# Patient Record
Sex: MALE | Race: WHITE | NOT HISPANIC OR LATINO | URBAN - METROPOLITAN AREA
[De-identification: names, ages, dates, MRNs, and addresses within clinical notes are randomized per-mention and may not be internally consistent; named-entity substitution may affect disease eponyms.]

---

## 2018-05-12 ENCOUNTER — EMERGENCY (EMERGENCY)
Facility: HOSPITAL | Age: 28
LOS: 0 days | Discharge: HOME | End: 2018-05-12
Attending: EMERGENCY MEDICINE | Admitting: EMERGENCY MEDICINE

## 2018-05-12 VITALS
HEART RATE: 79 BPM | DIASTOLIC BLOOD PRESSURE: 91 MMHG | OXYGEN SATURATION: 99 % | SYSTOLIC BLOOD PRESSURE: 143 MMHG | RESPIRATION RATE: 19 BRPM

## 2018-05-12 VITALS
RESPIRATION RATE: 19 BRPM | SYSTOLIC BLOOD PRESSURE: 151 MMHG | HEIGHT: 74 IN | TEMPERATURE: 99 F | DIASTOLIC BLOOD PRESSURE: 98 MMHG | WEIGHT: 309.97 LBS | HEART RATE: 78 BPM

## 2018-05-12 DIAGNOSIS — Z90.81 ACQUIRED ABSENCE OF SPLEEN: Chronic | ICD-10-CM

## 2018-05-12 DIAGNOSIS — K08.89 OTHER SPECIFIED DISORDERS OF TEETH AND SUPPORTING STRUCTURES: ICD-10-CM

## 2018-05-12 RX ORDER — IBUPROFEN 200 MG
1 TABLET ORAL
Qty: 15 | Refills: 0
Start: 2018-05-12 | End: 2018-05-16

## 2018-05-12 RX ORDER — OXYCODONE AND ACETAMINOPHEN 5; 325 MG/1; MG/1
2 TABLET ORAL ONCE
Qty: 0 | Refills: 0 | Status: DISCONTINUED | OUTPATIENT
Start: 2018-05-12 | End: 2018-05-12

## 2018-05-12 RX ADMIN — OXYCODONE AND ACETAMINOPHEN 2 TABLET(S): 5; 325 TABLET ORAL at 03:35

## 2018-05-12 NOTE — ED PROVIDER NOTE - PHYSICAL EXAMINATION
VITAL SIGNS: I have reviewed nursing notes and confirm.  CONSTITUTIONAL: well-appearing, non-toxic, in pain  SKIN: Warm dry, normal skin turgor  HEAD: NCAT  ENT: + significant dental caries, tooth erosion to right molar and pre-molar, no gum redness or abscess.  NECK: Supple; non tender. Full ROM. No cervical LAD  CARD: RRR, no murmurs, rubs or gallops  RESP: clear to ausculation b/l.  No rales, rhonchi, or wheezing.  ABD: soft, + BS, non-tender, non-distended, no rebound or guarding.  NEURO: normal motor. normal sensory. CN II-XII intact.

## 2018-05-12 NOTE — ED PROVIDER NOTE - MEDICAL DECISION MAKING DETAILS
I personally evaluated patient. I agree with the findings and plan with all documentation on chart except as documented  in my note.    Full DC instructions discussed and patient knows when to seek immediate medical attention.  Patient has proper follow up.  All results discussed and patient aware they may require further work up.  Proper follow up ensured. Limitations of ED work up discussed.  Medications administered and prescribed/OTC home meds discussed.  All questions and concerns from patient or family addressed. Understanding of instructions verbalized.

## 2018-05-12 NOTE — ED PROVIDER NOTE - OBJECTIVE STATEMENT
29 y/o M who presents with right lower dental pain for 1 day.  Patient ate steak and had a piece stuck in his tooth.  He then started having extreme pain.  Patient reports pain as 10/10, sharp in nature, located to right lower mouth, radiating to jaw, worse with air hitting it and chewing.     Patient denies any fever, chills, pus drainage, bleeding, or other issues.  He has not been to the dentist in a very long time.

## 2018-05-12 NOTE — ED PROVIDER NOTE - NS ED ROS FT
Constitutional:  No fever, chills, lethargy, or abnormal weight loss  ENMT: + dental pain  Cardiac:  No chest pain or palpitations  Respiratory:  No cough or respiratory distress.   GI:  No nausea, vomiting, diarrhea or abdominal pain.  MS:  No back or joint pain.  Neuro:  No headache. No numbness, weakness, or tingling.   Skin:  No skin rash  Except as documented in the HPI,  all other systems are negative

## 2018-05-12 NOTE — ED PROVIDER NOTE - CHIEF COMPLAINT
The patient is a 28y Male complaining of see chief complaint quote. The patient is a 28y Male complaining of dental pain

## 2019-07-28 ENCOUNTER — EMERGENCY (EMERGENCY)
Facility: HOSPITAL | Age: 29
LOS: 0 days | Discharge: HOME | End: 2019-07-28
Attending: EMERGENCY MEDICINE | Admitting: EMERGENCY MEDICINE
Payer: COMMERCIAL

## 2019-07-28 VITALS
TEMPERATURE: 98 F | HEIGHT: 74 IN | RESPIRATION RATE: 18 BRPM | OXYGEN SATURATION: 98 % | DIASTOLIC BLOOD PRESSURE: 101 MMHG | HEART RATE: 97 BPM | WEIGHT: 300.05 LBS | SYSTOLIC BLOOD PRESSURE: 157 MMHG

## 2019-07-28 DIAGNOSIS — Z90.81 ACQUIRED ABSENCE OF SPLEEN: Chronic | ICD-10-CM

## 2019-07-28 DIAGNOSIS — Z79.1 LONG TERM (CURRENT) USE OF NON-STEROIDAL ANTI-INFLAMMATORIES (NSAID): ICD-10-CM

## 2019-07-28 DIAGNOSIS — Z79.891 LONG TERM (CURRENT) USE OF OPIATE ANALGESIC: ICD-10-CM

## 2019-07-28 DIAGNOSIS — L30.9 DERMATITIS, UNSPECIFIED: ICD-10-CM

## 2019-07-28 DIAGNOSIS — L03.116 CELLULITIS OF LEFT LOWER LIMB: ICD-10-CM

## 2019-07-28 DIAGNOSIS — L53.9 ERYTHEMATOUS CONDITION, UNSPECIFIED: ICD-10-CM

## 2019-07-28 PROCEDURE — 99283 EMERGENCY DEPT VISIT LOW MDM: CPT

## 2019-07-28 RX ORDER — CEPHALEXIN 500 MG
1 CAPSULE ORAL
Qty: 40 | Refills: 0
Start: 2019-07-28 | End: 2019-08-06

## 2019-07-28 RX ORDER — AZTREONAM 2 G
1 VIAL (EA) INJECTION
Qty: 20 | Refills: 0
Start: 2019-07-28 | End: 2019-08-06

## 2019-07-28 NOTE — ED PROVIDER NOTE - PHYSICAL EXAMINATION
Vital Signs: I have reviewed the initial vital signs.  Constitutional: well-nourished, appears stated age, no acute distress  Cardiovascular: regular rate, regular rhythm, well-perfused extremities  Respiratory: unlabored respiratory effort, clear to auscultation bilaterally  Skin: left shin 4 x 4 cm erythema, warmth, ttp left lateral sole dry scaly eczematous rash   Psychiatric: appropriate mood, appropriate affect

## 2019-07-28 NOTE — ED PROVIDER NOTE - NSFOLLOWUPINSTRUCTIONS_ED_ALL_ED_FT
Cellulitis    Cellulitis is a skin infection caused by bacteria. This condition occurs most often in the arms and lower legs but can occur anywhere over the body. Symptoms include redness, swelling, warm skin, tenderness, and chills/fever. If you were prescribed an antibiotic medicine, take it as told by your health care provider. Do not stop taking the antibiotic even if you start to feel better.    SEEK IMMEDIATE MEDICAL CARE IF YOU HAVE ANY OF THE FOLLOWING SYMPTOMS: worsening fever, red streaks coming from affected area, vomiting or diarrhea, or dizziness/lightheadedness. or if symptoms don't improve

## 2019-07-28 NOTE — ED PROVIDER NOTE - ATTENDING CONTRIBUTION TO CARE
28 yo M pmh of HTN presents with redness to his left shin. States that he noted it this morning. States that yesterday he had some nasal congestion and chills that have resolved. no fevers, no chills today. no hx of cellulitis. States that he had dry skin and commonly has cracks in his skin. no drainage. no numbness, tingling or weakness, no calf pain, no legs swelling.     CONSTITUTIONAL: Well-developed; well-nourished; in no acute distress.   SKIN: 4cm area of cellulitis to the left shin, no fluctuance, no edema.   ENT: No nasal discharge; airway clear. non erythematous pharynx.   NECK: Supple; non tender.  CARD: S1, S2 normal;  Regular rate and rhythm.   RESP: No wheezes, rales or rhonchi.  EXT: Normal ROM.  no pedal edema, no calf tenderness   NEURO: neurovascularly intact

## 2019-07-28 NOTE — ED ADULT NURSE NOTE - NSIMPLEMENTINTERV_GEN_ALL_ED
Implemented All Universal Safety Interventions:  [place] to call system. Call bell, personal items and telephone within reach. Instruct patient to call for assistance. Room bathroom lighting operational. Non-slip footwear when patient is off stretcher. Physically safe environment: no spills, clutter or unnecessary equipment. Stretcher in lowest position, wheels locked, appropriate side rails in place.

## 2019-07-28 NOTE — ED PROVIDER NOTE - CARE PROVIDER_API CALL
Mariusz Herrera)  Dermatology; Internal Medicine  [address] [place] [address]  Phone: [phone]  Fax: [phone]  Follow Up Time:

## 2019-07-28 NOTE — ED PROVIDER NOTE - CLINICAL SUMMARY MEDICAL DECISION MAKING FREE TEXT BOX
Patient presented with cellulitis to E. Area of redness marked. Understands to return if area of infections worsens or develops fevers. Sent home with antibiotics and dermatologist

## 2019-07-28 NOTE — ED ADULT TRIAGE NOTE - CHIEF COMPLAINT QUOTE
pt c/o pain/swelling/redness to left lower leg/ankle, c/o stiff neck and chills, symptoms started yesterday. no fevers.

## 2019-07-28 NOTE — ED PROVIDER NOTE - NS ED ROS FT
Constitutional: See HPI.  Cardiac: No SOB or edema. No chest pain with exertion.  Respiratory: No cough or respiratory distress. No hemoptysis. No history of asthma  Skin:+ skin rash.  Except as documented in the HPI, all other systems are negative.

## 2019-08-26 ENCOUNTER — EMERGENCY (EMERGENCY)
Facility: HOSPITAL | Age: 29
LOS: 0 days | Discharge: HOME | End: 2019-08-26
Attending: EMERGENCY MEDICINE | Admitting: EMERGENCY MEDICINE
Payer: COMMERCIAL

## 2019-08-26 VITALS
OXYGEN SATURATION: 99 % | TEMPERATURE: 99 F | RESPIRATION RATE: 18 BRPM | SYSTOLIC BLOOD PRESSURE: 139 MMHG | DIASTOLIC BLOOD PRESSURE: 75 MMHG | HEART RATE: 91 BPM

## 2019-08-26 VITALS
RESPIRATION RATE: 17 BRPM | DIASTOLIC BLOOD PRESSURE: 87 MMHG | OXYGEN SATURATION: 98 % | HEART RATE: 100 BPM | HEIGHT: 74 IN | SYSTOLIC BLOOD PRESSURE: 141 MMHG | TEMPERATURE: 100 F | WEIGHT: 300.05 LBS

## 2019-08-26 DIAGNOSIS — M79.669 PAIN IN UNSPECIFIED LOWER LEG: ICD-10-CM

## 2019-08-26 DIAGNOSIS — Z90.81 ACQUIRED ABSENCE OF SPLEEN: Chronic | ICD-10-CM

## 2019-08-26 DIAGNOSIS — L03.116 CELLULITIS OF LEFT LOWER LIMB: ICD-10-CM

## 2019-08-26 DIAGNOSIS — B35.3 TINEA PEDIS: ICD-10-CM

## 2019-08-26 PROCEDURE — 93970 EXTREMITY STUDY: CPT | Mod: 26

## 2019-08-26 PROCEDURE — 99284 EMERGENCY DEPT VISIT MOD MDM: CPT

## 2019-08-26 RX ORDER — CEPHALEXIN 500 MG
1 CAPSULE ORAL
Qty: 14 | Refills: 0
Start: 2019-08-26 | End: 2019-09-01

## 2019-08-26 NOTE — ED PROVIDER NOTE - NSFOLLOWUPINSTRUCTIONS_ED_ALL_ED_FT
Tinea    Tinea is an infection of the skin caused by funguses. It can present in various areas of the body including the head (tinea capitis), body (tinea corporis or ringworm), groin (tinea cruris or jock itch), and foot (tinea pedis or athlete’s foot). Symptoms include an itchy red rash that may be ring-shaped and have central clearing and scales. Treatment may involve an antifungal cream or medicines by mouth.     SEEK IMMEDIATE MEDICAL CARE IF YOU HAVE ANY OF THE FOLLOWING SYMPTOMS: rash continues to spread, rash lasts over 4 weeks, or worsening warmth, tenderness, or swelling.      Cellulitis    Cellulitis is a skin infection caused by bacteria. This condition occurs most often in the arms and lower legs but can occur anywhere over the body. Symptoms include redness, swelling, warm skin, tenderness, and chills/fever. If you were prescribed an antibiotic medicine, take it as told by your health care provider. Do not stop taking the antibiotic even if you start to feel better.    SEEK IMMEDIATE MEDICAL CARE IF YOU HAVE ANY OF THE FOLLOWING SYMPTOMS: worsening fever, red streaks coming from affected area, vomiting or diarrhea, or dizziness/lightheadedness.

## 2019-08-26 NOTE — ED PROVIDER NOTE - OBJECTIVE STATEMENT
this is 30 yo male presents to ed for evaluation of pain to left lower leg. patient states pain in calf started yesterday. patient admits to problem with his feet . patient states his toes are so itchy

## 2019-08-26 NOTE — ED PROVIDER NOTE - PHYSICAL EXAMINATION
--EXAM--  VITAL SIGNS: I have reviewed vs documented at present.  CONSTITUTIONAL: Well-developed; well-nourished; in no acute distress.   SKIN: Warm and dry, no acute rash.     left foot rash noted in between toes  skin on foot is erythematous     left calf no tenderness no swelling positive pain     CARD: S1, S2, Regular rate and rhythm.   RESP: No wheezes, rales or rhonchi.     NEURO: Alert, oriented, grossly unremarkable. Strength 5/5 in all extremities. Sensation intact throughout.

## 2019-08-26 NOTE — ED PROVIDER NOTE - CLINICAL SUMMARY MEDICAL DECISION MAKING FREE TEXT BOX
I personally evaluated the patient. I reviewed the Resident’s or Physician Assistant’s note (as assigned above), and agree with the findings and plan except as documented in my note. Patient educated on tinea treatment. I have fully discussed the medical management and delivery of care with the patient. I have discussed any available labs, imaging and treatment options with the patient. Patient confirms understanding and has been given detailed return precautions. Patient instructed to return to the ED should symptoms persist or worsen. Patient has demonstrated capacity and has verbalized understanding. Patient is well appearing upon discharge. Patient understands to return to the ed for repeat US if symptoms not improve din 1 week

## 2019-08-26 NOTE — ED ADULT NURSE NOTE - NSIMPLEMENTINTERV_GEN_ALL_ED
Implemented All Universal Safety Interventions:  [place] to call system. Call bell, personal items and telephone within reach. Instruct patient to call for assistance. Room bathroom lighting operational. Non-slip footwear when patient is off stretcher. Physically safe environment: no spills, clutter or unnecessary equipment. Stretcher in lowest position, wheels locked, appropriate side rails in place.

## 2019-08-26 NOTE — ED PROVIDER NOTE - NS ED ROS FT
Review of Systems:  	•	CONSTITUTIONAL - no fever, no diaphoresis, no chills  	•	SKIN - left foot  itchy rash   left leg calf pain   	•	HEMATOLOGIC - no bleeding, no bruising  	  	•	RESPIRATORY - no shortness of breath, no cough  	•	CARDIAC - no chest pain, no palpitations  	•

## 2019-08-26 NOTE — ED PROVIDER NOTE - ATTENDING CONTRIBUTION TO CARE
29 year old male, comes in with complaint of left calf pain and left foot erythema between the toes, no cp/sob, no n/v/d, no fever.    CONSTITUTIONAL: Well-developed; well-nourished; in no acute distress. Sitting up and providing appropriate history and physical examination  SKIN: skin exam is warm and dry, no acute rash.  HEAD: Normocephalic; atraumatic.  EYES: PERRL, 3 mm bilateral, no nystagmus, EOM intact; conjunctiva and sclera clear.  ENT: No nasal discharge; airway clear.  EXT: + left calf tenderness, + left tinea pedis, Normal ROM. No clubbing, cyanosis or edema. Dp and Pt Pulses intact. Cap refill less than 3 seconds  NEURO: Alert, oriented, grossly unremarkable. No Focal deficits. GCS 15. NIH 0  PSYCH: Cooperative, appropriate.

## 2019-08-26 NOTE — ED PROVIDER NOTE - CARE PROVIDER_API CALL
Jared Macdonald)  Vascular Surgery  [address] [place] [address]  Phone: [phone]  Fax: [phone]  Follow Up Time:     Mariusz Herrera)  Dermatology; Internal Medicine  [address] [place] [address]  Phone: [phone]  Fax: [phone]  Follow Up Time:

## 2020-03-18 NOTE — ED ADULT NURSE NOTE - NS ED NURSE RECORD ANOTHER VITAL SIGN
Yes
home meds Metformin 1000 mg BID& Repaglinide 1mg TID  - will start Lantus 10unit and HSS  -Fingerstick ACHS as patient has been on clear diet
home meds Metformin 1000 mg BID& Repaglinide 1mg TID  - will start Lantus 10unit and HSS  -Fingerstick ACHS as patient has been on diet  Advance diet

## 2020-08-28 ENCOUNTER — EMERGENCY (EMERGENCY)
Facility: HOSPITAL | Age: 30
LOS: 0 days | Discharge: HOME | End: 2020-08-28
Attending: EMERGENCY MEDICINE | Admitting: EMERGENCY MEDICINE
Payer: COMMERCIAL

## 2020-08-28 VITALS
SYSTOLIC BLOOD PRESSURE: 164 MMHG | HEART RATE: 98 BPM | WEIGHT: 300.05 LBS | DIASTOLIC BLOOD PRESSURE: 84 MMHG | OXYGEN SATURATION: 97 % | TEMPERATURE: 98 F | HEIGHT: 74 IN | RESPIRATION RATE: 20 BRPM

## 2020-08-28 DIAGNOSIS — Z79.899 OTHER LONG TERM (CURRENT) DRUG THERAPY: ICD-10-CM

## 2020-08-28 DIAGNOSIS — K02.9 DENTAL CARIES, UNSPECIFIED: ICD-10-CM

## 2020-08-28 DIAGNOSIS — Z90.81 ACQUIRED ABSENCE OF SPLEEN: Chronic | ICD-10-CM

## 2020-08-28 DIAGNOSIS — K08.89 OTHER SPECIFIED DISORDERS OF TEETH AND SUPPORTING STRUCTURES: ICD-10-CM

## 2020-08-28 DIAGNOSIS — I10 ESSENTIAL (PRIMARY) HYPERTENSION: ICD-10-CM

## 2020-08-28 DIAGNOSIS — F17.200 NICOTINE DEPENDENCE, UNSPECIFIED, UNCOMPLICATED: ICD-10-CM

## 2020-08-28 DIAGNOSIS — K03.81 CRACKED TOOTH: ICD-10-CM

## 2020-08-28 DIAGNOSIS — Z91.09 OTHER ALLERGY STATUS, OTHER THAN TO DRUGS AND BIOLOGICAL SUBSTANCES: ICD-10-CM

## 2020-08-28 PROCEDURE — 99284 EMERGENCY DEPT VISIT MOD MDM: CPT

## 2020-08-28 RX ORDER — KETOROLAC TROMETHAMINE 30 MG/ML
30 SYRINGE (ML) INJECTION ONCE
Refills: 0 | Status: DISCONTINUED | OUTPATIENT
Start: 2020-08-28 | End: 2020-08-28

## 2020-08-28 RX ADMIN — Medication 30 MILLIGRAM(S): at 04:44

## 2020-08-28 RX ADMIN — Medication 1 TABLET(S): at 04:43

## 2020-08-28 NOTE — ED PROVIDER NOTE - CLINICAL SUMMARY MEDICAL DECISION MAKING FREE TEXT BOX
30yM p/w dental pain in the setting of known caries.  No concern for airway compromise or abscess.  Pt given analgesics, started on abx and referred to dental.  Ok to dc with supportive care, return precautions.

## 2020-08-28 NOTE — ED PROVIDER NOTE - ATTENDING CONTRIBUTION TO CARE
30yM obese HTN p/w dental pain - has known poor dentition/multiple caries and developed left lower dental pain.  No drooling, stridor, swelling or airway compromise.  No resp distress.  Exam w/ dental caries, no apparent abscess, airway intact, breathing comfortably on RA, speaking in full sentences w/o distress.

## 2020-08-28 NOTE — ED PROVIDER NOTE - PROGRESS NOTE DETAILS
IVAN: Patient given toradol and augmentin in ED. Antibiotics sent to pharmacy, recommended dental clinic outpatient. Patient agreeable and verbalizes understanding of plan of care, f/u, and return precautions.

## 2020-08-28 NOTE — ED PROVIDER NOTE - OBJECTIVE STATEMENT
30 year old male w no pmhx presents to the ED for evaluation of constant, mild, non-radiating left lower dental pain x 2 days. Pain worse with chewing, not improved with Advil. Pt does not have private dentist. Denies fevers/chills, facial swelling, sore throat, difficulty swallowing, voice change, difficulty breathing, n/v, recent dental procedures.

## 2020-08-28 NOTE — ED PROVIDER NOTE - PHYSICAL EXAMINATION
VITALS:  I have reviewed the initial vital signs.  GENERAL: Well-developed, well-nourished, in no acute distress. Nontoxic.  HEENT: MMM, tolerating oral secretions. Poor dentition w multiple caries and cracked teeth. +ttp to teeth #17-19. No abscess. No floor of mouth or submandibular swelling. No tongue elevation.    NECK: supple w FROM.   PULM: Normal effort. No tachypnea or retractions. No stridor.   SKIN: Warm, dry.  NEURO: A&Ox3. Speech clear. CN II-XII intact. No focal deficits.

## 2020-08-28 NOTE — ED PROVIDER NOTE - PATIENT PORTAL LINK FT
You can access the FollowMyHealth Patient Portal offered by [facility] by registering at the following website: [email or web address]. By joining [facility]’s FollowMyHealth portal, you will also be able to view your health information using other applications (apps) compatible with our system.

## 2020-11-21 ENCOUNTER — EMERGENCY (EMERGENCY)
Facility: HOSPITAL | Age: 30
LOS: 0 days | Discharge: HOME | End: 2020-11-21
Attending: EMERGENCY MEDICINE | Admitting: EMERGENCY MEDICINE
Payer: COMMERCIAL

## 2020-11-21 VITALS
TEMPERATURE: 98 F | WEIGHT: 300.05 LBS | HEIGHT: 74 IN | OXYGEN SATURATION: 100 % | SYSTOLIC BLOOD PRESSURE: 159 MMHG | DIASTOLIC BLOOD PRESSURE: 78 MMHG | HEART RATE: 74 BPM | RESPIRATION RATE: 18 BRPM

## 2020-11-21 DIAGNOSIS — Z90.81 ACQUIRED ABSENCE OF SPLEEN: Chronic | ICD-10-CM

## 2020-11-21 DIAGNOSIS — K08.89 OTHER SPECIFIED DISORDERS OF TEETH AND SUPPORTING STRUCTURES: ICD-10-CM

## 2020-11-21 DIAGNOSIS — K02.9 DENTAL CARIES, UNSPECIFIED: ICD-10-CM

## 2020-11-21 PROCEDURE — 99283 EMERGENCY DEPT VISIT LOW MDM: CPT

## 2020-11-21 RX ORDER — AMOXICILLIN 250 MG/5ML
1 SUSPENSION, RECONSTITUTED, ORAL (ML) ORAL
Qty: 14 | Refills: 0
Start: 2020-11-21 | End: 2020-11-27

## 2020-11-21 NOTE — ED PROVIDER NOTE - NSFOLLOWUPCLINICS_GEN_ALL_ED_FT
[facility]  Dental  [address] [place] [id number]  Phone: [phone]  Fax:   Follow Up Time: 1-3 Days

## 2020-11-21 NOTE — ED PROVIDER NOTE - ATTENDING CONTRIBUTION TO CARE
30y M with no sig PMHx presents for dental pain for 1 day, left upper molar. Denies fever, swelling, bleeding, SOB, trismus, voice changes/hoarseness, dysphagia. Pt cites fear of dentists and hasn't been to one in many years.    Vital signs reviewed  GENERAL: Patient nontoxic appearing, NAD  ENT: MMM. TTP Tooth #15. Multiple teeth with gross decay and caries, overall very poor dentition. No gingival erythema or discharge.   NECK: Supple, non tender

## 2020-11-21 NOTE — ED PROVIDER NOTE - OBJECTIVE STATEMENT
Pt is a 30 year old male with no PMH presents to ED with complaint of dentalgia. Pt states toothache started this evening, locates to L rear upper teeth. Pain is mild-moderate, throbbing, non radiating with no alleviating or aggravating factors. Denies fever, dysphagia, odynophagia, chest pain or inability to tolerate secretions

## 2020-11-21 NOTE — ED PROVIDER NOTE - NSFOLLOWUPINSTRUCTIONS_ED_ALL_ED_FT
Follow up with your primary medical doctor in 1-2 days as well as with the dental clinic. take the antibiotic as directed     Dental Pain    Dental pain (toothache) may be caused by many things including tooth decay (cavities or caries), abscess or infection, injury, or the reason may be unknown. Your pain may only occur when you are chewing, are exposed to hot or cold temperature, are eating or drinking sugary foods or beverages, or your pain may be constant. If you were prescribed an antibiotic medicine, finish all of it even if you start to feel better. Rinsing your mouth with salt water or applying ice to the painful area of your face may help with the pain.    SEEK IMMEDIATE MEDICAL CARE IF YOU HAVE THE FOLLOWING SYMPTOMS: unable to open mouth, trouble breathing or swallowing, fever, or swelling of the face, neck or jaw.

## 2020-11-21 NOTE — ED PROVIDER NOTE - NS ED ROS FT
Constitutional: (-) fever  Eyes/ENT: (-) blurry vision, (-) epistaxis (+) dentalgia   Cardiovascular: (-) chest pain, (-) syncope  Musculoskeletal: (-) neck pain, (-) back pain, (-) joint pain  Integumentary: (-) rash, (-) edema  Neurological: (-) headache, (-) altered mental status  Psychiatric: (-) hallucinations

## 2020-11-21 NOTE — ED PROVIDER NOTE - PHYSICAL EXAMINATION
Physical Exam    Vital Signs: I have reviewed the initial vital signs.  Constitutional: well-nourished, appears stated age, no acute distress  Eyes: Conjunctiva pink, Sclera clear, PERRLA, EOMI.  Throat: uvula midline, no tonsillar erythema, swelling or exudates noted. no abscess noted, no LAD. TTP over tooth 14 with notable jessica   Musculoskeletal: supple neck, no lower extremity edema, no midline tenderness  Integumentary: warm, dry, no rash  Neurologic: awake, alert, cranial nerves II-XII grossly intact, extremities’ motor and sensory functions grossly intact  Psychiatric: appropriate mood, appropriate affect

## 2020-11-21 NOTE — ED PROVIDER NOTE - PATIENT PORTAL LINK FT
You can access the FollowMyHealth Patient Portal offered by [facility] by registering at the following website: [email or web address]. By joining [facility]’s FollowMyHealth portal, you will also be able to view your health information using other applications (apps) compatible with our system.

## 2020-11-21 NOTE — ED PROVIDER NOTE - CLINICAL SUMMARY MEDICAL DECISION MAKING FREE TEXT BOX
31yo M presents for dental pain. Advised to follow up with dental clinic on Monday. Offered pain meds but pt refused. Will given ABx to take in meantime. Return precautions given.

## 2020-11-21 NOTE — ED PROVIDER NOTE - PROGRESS NOTE DETAILS
: pt does not want motrin or tylenol at this time. will be dc with oral abx and follow up with dental clinic

## 2022-02-01 NOTE — ED ADULT NURSE NOTE - NSFALLRSKASSESSTYPE_ED_ALL_ED
Medication:   Requested Prescriptions     Pending Prescriptions Disp Refills    rosuvastatin (CRESTOR) 10 MG tablet 90 tablet 0     Sig: TAKE ONE TABLET BY MOUTH DAILY        Last Filled: 10/29/2021   Last appt: 5/4/2021   Next appt: none
Initial (On Arrival)

## 2023-03-30 NOTE — ED ADULT NURSE NOTE - RADIATION
Encounter Date: 3/30/2023       History     Chief Complaint   Patient presents with    Abdominal Pain     H. Pylori diagnosis recently, still on ABX. Recurrent nausea is unchanged but abd pain became worse this morning.      Emergent evaluation of a 29-year-old male who was diagnosed with his primary care provider with H pylori February 13th or 14th 2023 he reports that he was having issues with upper abdominal pain nausea vomiting and significant weight loss for almost a year he reports that he had been very obese and has lost approximately 100 lb in the past 7 months he now weighs 90.7 kg  (200 lbs) he had been on 137.4 kg  (302 lbs) in August 2022.  He reports he has nausea daily with intermittent vomiting he vomited 3 times today reports it was food contents and now bile no blood or black emesis.  Reports that he has been having bowel movements has not been constipated no blood or black stool.  Reports pain is in the periumbilical region just above the umbilicus and feels like a squeezing sensation.  This is been intermittent.  He was seen in the ER on March 16th and was switched from amoxicillin to triple therapy with clarithromycin Flagyl and Protonix he reports no significant difference.  He is not yet been able to see GI.  He reports he gets chills and sweats but no fevers.  No urinary symptoms.  No chest pain or shortness of breath.  He reports pain acutely worsened 20-30 minutes ago and is currently 10/10.    Review of patient's allergies indicates:  No Known Allergies  Past Medical History:   Diagnosis Date    COVID-19 JAN 2021    Obese      History reviewed. No pertinent surgical history.  Family History   Problem Relation Age of Onset    Hypertension Mother     Sleep apnea Mother     No Known Problems Father      Social History     Tobacco Use    Smoking status: Never    Smokeless tobacco: Never   Substance Use Topics    Alcohol use: Yes     Alcohol/week: 0.0 standard drinks     Comment: socially     Drug use: Yes     Comment: used to use marijuana for sleep (3 years)      Review of Systems   Constitutional:  Positive for activity change, appetite change, chills, diaphoresis and unexpected weight change. Negative for fatigue and fever.   HENT:  Negative for congestion, postnasal drip and rhinorrhea.    Respiratory:  Negative for cough, chest tightness, shortness of breath and wheezing.    Cardiovascular:  Negative for chest pain and palpitations.   Gastrointestinal:  Positive for abdominal pain, nausea and vomiting. Negative for constipation and diarrhea.   Genitourinary:  Negative for dysuria, frequency and urgency.   Musculoskeletal:  Negative for neck pain and neck stiffness.   Neurological:  Negative for dizziness, weakness, light-headedness, numbness and headaches.   Psychiatric/Behavioral:  Negative for confusion.    All other systems reviewed and are negative.    Physical Exam     Initial Vitals [03/30/23 1333]   BP Pulse Resp Temp SpO2   121/71 73 18 98.1 °F (36.7 °C) 96 %      MAP       --         Physical Exam    Nursing note and vitals reviewed.  Constitutional: He appears well-developed and well-nourished. He is not diaphoretic. No distress.   HENT:   Head: Normocephalic and atraumatic.   Right Ear: External ear normal.   Left Ear: External ear normal.   Nose: Nose normal.   Mouth/Throat: Oropharynx is clear and moist.   Eyes: Conjunctivae and EOM are normal. Pupils are equal, round, and reactive to light.   Neck: Neck supple. No tracheal deviation present.   Normal range of motion.  Cardiovascular:  Normal rate, regular rhythm, normal heart sounds and intact distal pulses.     Exam reveals no gallop and no friction rub.       No murmur heard.  Pulmonary/Chest: Breath sounds normal. No stridor. No respiratory distress. He has no wheezes. He has no rhonchi. He has no rales. He exhibits no tenderness.   Abdominal: Abdomen is soft. Bowel sounds are normal. He exhibits no distension and no mass. There is  abdominal tenderness in the right upper quadrant, epigastric area and periumbilical area. There is no rebound and no guarding.   Musculoskeletal:         General: No edema. Normal range of motion.      Cervical back: Normal range of motion and neck supple.     Neurological: He is alert and oriented to person, place, and time. He has normal strength. No cranial nerve deficit or sensory deficit.   Skin: Skin is warm and dry. No rash noted. No erythema. No pallor.   Psychiatric: He has a normal mood and affect. His behavior is normal. Judgment and thought content normal.       ED Course   Procedures  Labs Reviewed   CBC W/ AUTO DIFFERENTIAL - Abnormal; Notable for the following components:       Result Value    MCV 99 (*)     MCH 32.9 (*)     Lymph % 16.5 (*)     All other components within normal limits   COMPREHENSIVE METABOLIC PANEL - Abnormal; Notable for the following components:    Total Bilirubin 1.9 (*)     All other components within normal limits   URINALYSIS, REFLEX TO URINE CULTURE - Abnormal; Notable for the following components:    Specific Gravity, UA >1.030 (*)     Protein, UA 2+ (*)     Ketones, UA 3+ (*)     Urobilinogen, UA 4.0-6.0 (*)     All other components within normal limits    Narrative:     Specimen Source->Urine   URINALYSIS MICROSCOPIC - Abnormal; Notable for the following components:    RBC, UA 7 (*)     Hyaline Casts, UA 2 (*)     All other components within normal limits    Narrative:     Specimen Source->Urine   MAGNESIUM   LIPASE   ISTAT CREATININE   POCT CREATININE          Imaging Results              CT Abdomen Pelvis With Contrast (Final result)  Result time 03/30/23 17:36:47      Final result by Faraz Sim MD (03/30/23 17:36:47)                   Narrative:    EXAM DESCRIPTION: CT ABDOMEN PELVIS WITH CONTRAST    CLINICAL HISTORY: 29 years Male, Abdominal abscess/infection suspected    COMPARISON: March 16, 2023.    TECHNIQUE: Images of the abdomen and pelvis were obtained  after the administration of 100 mL of Omnipaque 350. All CT scans at this facility utilized dose modulation, iterative reconstruction, and/or weightbase dosing when appropriate to reduce the radiation dose to his low as reasonably achievable. CTDI 9.00. .90.    FINDINGS: The lower lung fields are unremarkable. The liver and spleen are normal in size, and no focal abnormalities or ascites can be seen. There appears to be some decreased attenuation throughout the liver. The pancreas is normal in size, as are the adrenal glands and the abdominal aorta. The kidneys are normal in size and no evidence of a renal mass or hydronephrosis is seen.    The small bowel is normal in size. The appendix is not visualized, but no evidence of appendicitis is seen. The urinary bladder is unopacified but is unremarkable. The prostate remains enlarged. No osseous lesions are seen.    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    Electronically signed by:  Faraz Sim MD  3/30/2023 5:36 PM CDT Workstation: [id number]                                     Medications   sodium chloride 0.9% bolus 1,000 mL 1,000 mL (0 mLs Intravenous Stopped 3/30/23 1915)   sucralfate 100 mg/mL suspension 1 g (1 g Oral Given 3/30/23 1444)   pantoprazole injection 40 mg (40 mg Intravenous Given 3/30/23 1444)   ondansetron injection 4 mg (4 mg Intravenous Given 3/30/23 1443)   HYDROmorphone injection 0.5 mg (0.5 mg Intravenous Given 3/30/23 1753)   iohexoL (OMNIPAQUE 350) injection 100 mL (100 mLs Intravenous Given 3/30/23 1717)   sodium chloride 0.9% bolus 1,000 mL 1,000 mL (0 mLs Intravenous Stopped 3/30/23 2110)   HYDROmorphone injection 0.5 mg (0.5 mg Intravenous Given 3/30/23 2020)     Medical Decision Making:   History:   Old Medical Records: I decided to obtain old medical records.  Old Records Summarized: other records.       <> Summary of Records: I reviewed and summarized the ER visit for March 16th  Independently Interpreted  Test(s):   I have ordered and independently interpreted X-rays - see prior notes.  Clinical Tests:   Lab Tests: Ordered and Reviewed  The following lab test(s) were unremarkable: CBC       <> Summary of Lab: Bili 1.9  Mag of 2 lipase 28    Urine with increased specific gravity 2+ protein 3+ ketones no leukocytes or blood  Radiological Study: Ordered and Reviewed  ED Management:  Emergent evaluation of a 29-year-old male who was diagnosed with his primary care provider with H pylori February 13th or 14th 2023 he reports that he was having issues with upper abdominal pain nausea vomiting and significant weight loss for almost a year he reports that he had been very obese and has lost approximately 100 lb in the past 7 months he now weighs 90.7 kg  (200 lbs) he had been on 137.4 kg  (302 lbs) in August 2022.  He reports he has nausea daily with intermittent vomiting he vomited 3 times today reports it was food contents and now bile no blood or black emesis.  Reports that he has been having bowel movements has not been constipated no blood or black stool.  Reports pain is in the periumbilical region just above the umbilicus and feels like a squeezing sensation.  This is been intermittent.  He was seen in the ER on March 16th and was switched from amoxicillin to triple therapy with clarithromycin Flagyl and Protonix he reports no significant difference.  He is not yet been able to see GI.  He reports he gets chills and sweats but no fevers.  No urinary symptoms.  No chest pain or shortness of breath.  He reports pain acutely worsened 20-30 minutes ago and is currently 10/10.  On physical exam patient is very uncomfortable laying in bed with his eyes closed wincing in pain pain is just superior to the umbilicus with tenderness at the epigastrium upper abdomen above the umbilicus and right upper quadrant.  No rebound or guarding.  It is visible the patient has lost a large amount of weight recently.  Due to multiple large  skin folds.  No signs of dehydration.  Normal cardiac and lung exam.  Normal vitals pulse 73 blood pressure 121/71 afebrile 98.1.    [facility]    Patient presents for emergent evaluation of acute upper abdominal pain nausea vomiting recent H pylori diagnosis that poses a threat to life and/or bodily function.   Differential diagnosis includes but was not limited to  acute pancreatitis, acute cholecystitis and cholangitis, H pylori ulcers versus gastritis, per ulcer with peritonitis, colitis, acute appendicitis, urinary tract infection,  testicular torsion, epididymitis and orchitis, prostatitis, pyelonephritis, kidney stone, volvulus, small bowel obstruction, enteritis, gastritis, mesenteric ischemia, AAA, AAA rupture, aortic dissection    .   In the ED patient found to have acute epigastric abdominal pain gastritis from H pylori infection  I ordered labs and personally reviewed them.  Labs significant for see above    I ordered CT scan and personally reviewed it and reviewed the radiologist interpretation.  CT significant for The lower lung fields are unremarkable. The liver and spleen are normal in size, and no focal abnormalities or ascites can be seen. There appears to be some decreased attenuation throughout the liver. The pancreas is normal in size, as are the adrenal glands and the abdominal aorta. The kidneys are normal in size and no evidence of a renal mass or hydronephrosis is seen.     The small bowel is normal in size. The appendix is not visualized, but no evidence of appendicitis is seen. The urinary bladder is unopacified but is unremarkable. The prostate remains enlarged. No osseous lesions are seen.     Discharge [facility]     Patient was managed in the ED with IV Protonix 40 mg IV, 4 of Zofran IV, 1 L normal saline, and Carafate 1 g orally- patient be reassessed  The response to treatment was improved patient required a 2 L of IV fluids, and did require Dilaudid 0.5 mg x 2 to improve his pain patient was then  able to rest.  CT revealed no acute abnormalities.  He is not having any signs of prostatitis.  No signs of perforation or thickening of the stomach.  Lab work was benign.  Patient has had a ambulatory referral to GI placed today.  Will go home continuing the triple therapy as well as adding Pepcid and Zofran.  Also prescribed Carafate which improved his pain mildly.  Patient was discharged in stable condition.  Detailed return precautions discussed.  Patient was told to follow up with primary care physician or specialist based on their diagnosis  Blanquita Stallworth MD 2:41 p.m             ED Course as of 03/30/23 2212   Thu Mar 30, 2023   1844 Patient reports no improvement in abdominal pain after Dilaudid 0.5 mg 1 L fluids is completed he has been unable to provide a urine sample.  I will give a 2nd 0.5 mg dose of Dilaudid and a  2 L fluids..  Blanquita Stallworth M.D.  6:44 PM 3/30/2023   [RM]   2010 Pt is getting 2nd liter bolus and giving Urine sample.   Blanquita Stallworth M.D.  8:12 PM 3/30/2023   [RM]      ED Course User Index  [RM] Blanquita Stallworth MD                 Clinical Impression:   Final diagnoses:  [A04.8] H. pylori infection (Primary)  [K21.9] Gastroesophageal reflux disease, unspecified whether esophagitis present  [R11.14] Bilious vomiting with nausea  [E86.0] Dehydration        ED Disposition Condition    Discharge Stable          ED Prescriptions       Medication Sig Dispense Start Date End Date Auth. Provider    famotidine (PEPCID) 20 MG tablet Take 1 tablet (20 mg total) by mouth 2 (two) times daily. 20 tablet 3/30/2023 3/29/2024 Blanquita Stallworth MD    sucralfate (CARAFATE) 100 mg/mL suspension Take 10 mLs (1 g total) by mouth 4 (four) times daily. for 10 days 414 mL 3/30/2023 4/9/2023 Blanquita Stallworth MD    ondansetron (ZOFRAN-ODT) 4 MG TbDL Take 1 tablet (4 mg total) by mouth every 8 (eight) hours as needed (Nausea or vomiting). 15 tablet 3/30/2023 -- Blanquita Stallworth MD           Follow-up Information       Follow up With Specialties Details Why Contact Info Additional Information    Magnus Velazquez MD Gastroenterology Schedule an appointment as soon as possible for a visit   [address]  [facility]  [facility] [address]  [phone]       [facility] - Emergency Dept Emergency Medicine Go to  If symptoms worsen [address]  [facility] [address]  [phone] [address] [place] [address]  Schedule an appointment as soon as possible for a visit  until you are able to be seen by  [facility] [address]  [phone]                Blanquita Stallworth MD  03/30/23 6387     jaw

## 2024-08-19 ENCOUNTER — EMERGENCY (EMERGENCY)
Facility: HOSPITAL | Age: 34
LOS: 0 days | Discharge: HOME | End: 2024-08-19
Attending: EMERGENCY MEDICINE
Payer: COMMERCIAL

## 2024-08-19 VITALS
SYSTOLIC BLOOD PRESSURE: 167 MMHG | HEART RATE: 65 BPM | RESPIRATION RATE: 18 BRPM | WEIGHT: 315 LBS | DIASTOLIC BLOOD PRESSURE: 102 MMHG | HEIGHT: 74 IN | TEMPERATURE: 98 F | OXYGEN SATURATION: 99 %

## 2024-08-19 DIAGNOSIS — H60.92 UNSPECIFIED OTITIS EXTERNA, LEFT EAR: ICD-10-CM

## 2024-08-19 DIAGNOSIS — H92.02 OTALGIA, LEFT EAR: ICD-10-CM

## 2024-08-19 DIAGNOSIS — Z90.81 ACQUIRED ABSENCE OF SPLEEN: Chronic | ICD-10-CM

## 2024-08-19 DIAGNOSIS — Z91.09 OTHER ALLERGY STATUS, OTHER THAN TO DRUGS AND BIOLOGICAL SUBSTANCES: ICD-10-CM

## 2024-08-19 PROCEDURE — 99283 EMERGENCY DEPT VISIT LOW MDM: CPT | Mod: 25

## 2024-08-19 PROCEDURE — 99283 EMERGENCY DEPT VISIT LOW MDM: CPT

## 2024-08-19 PROCEDURE — 96372 THER/PROPH/DIAG INJ SC/IM: CPT

## 2024-08-19 RX ORDER — KETOROLAC TROMETHAMINE 10 MG
30 TABLET ORAL ONCE
Refills: 0 | Status: DISCONTINUED | OUTPATIENT
Start: 2024-08-19 | End: 2024-08-19

## 2024-08-19 RX ORDER — DEXAMETHASONE 1.5 MG/1
12 TABLET ORAL ONCE
Refills: 0 | Status: COMPLETED | OUTPATIENT
Start: 2024-08-19 | End: 2024-08-19

## 2024-08-19 RX ORDER — NEOMYCIN/POLYMYX B/BUFFERS/HC 3.5-10K-1
8 SUSPENSION, DROPS(FINAL DOSAGE FORM)(ML) OTIC (EAR) ONCE
Refills: 0 | Status: COMPLETED | OUTPATIENT
Start: 2024-08-19 | End: 2024-08-19

## 2024-08-19 RX ADMIN — DEXAMETHASONE 12 MILLIGRAM(S): 1.5 TABLET ORAL at 06:15

## 2024-08-19 RX ADMIN — Medication 30 MILLIGRAM(S): at 06:15

## 2024-08-19 RX ADMIN — Medication 8 DROP(S): at 06:15

## 2024-08-19 NOTE — ED PROVIDER NOTE - OBJECTIVE STATEMENT
34 years old male no significant history present complaint left ear pain for about 2 days.  Report he was seen at outside urgent care and being treated with Augmentin and Cortisporin.  Used above medications and ibuprofen with no much relief so he comes to ED for evaluation.  Movement of left knee exacerbate the pain.  Otherwise denies fever, chills, recent illness, coughing, sore throat, body ache, neck pain and chest pain.

## 2024-08-19 NOTE — ED ADULT NURSE NOTE - NSFALLUNIVINTERV_ED_ALL_ED
Bed/Stretcher in lowest position, wheels locked, appropriate side rails in place/Call bell, personal items and telephone in reach/Instruct patient to call for assistance before getting out of bed/chair/stretcher/Non-slip footwear applied when patient is off stretcher/[place] to call system/Physically safe environment - no spills, clutter or unnecessary equipment/Purposeful proactive rounding/Room/bathroom lighting operational, light cord in reach

## 2024-08-19 NOTE — ED PROVIDER NOTE - ATTENDING APP SHARED VISIT CONTRIBUTION OF CARE
34-year-old male, no past medical history, presents with left earache for the past 2 days.  Seen in urgent care and given Augmentin and Cortisporin.  Exam shows swollen left ear canal, TMs normal, no mastoid tenderness, throat clear, lungs clear. No

## 2024-08-19 NOTE — ED PROVIDER NOTE - NSFOLLOWUPINSTRUCTIONS_ED_ALL_ED_FT
Otitis Externa    Continue to use the given ear drops from outside urgent care. 4 drops to left ear while lying on your right side for few minutes to allow medication to sip into the ear.     Our Emergency Department Referral Coordinators will be reaching out to you in the next 24-48 hours from 9:00am to 5:00pm to schedule a follow up appointment. Please expect a phone call from the hospital in that time frame. If you do not receive a call or if you have any questions or concerns, you can reach them at   [phone] [address] [place] [address] [phone] for ENT follow up    Otitis externa is a bacterial or fungal infection of the outer ear canal. This is the area from the eardrum to the outside of the ear. Otitis externa is sometimes called "swimmer's ear." Causes include swimming in dirty water, moisture remaining in ear after swimming or bathing, trauma to the ear, objects stuck in the ear, cuts or scrapes in our outside of the ear. Symptoms include itching, pain, swelling, redness in the ear canal, and fluid coming from the ear. To prevent recurrence of otitis media keep your ear dry, avoid scratching or putting objects inside your ear including cotton swabs, and avoid swimming in polluted water or poorly chlorinated pools.    SEEK MEDICAL CARE IF YOU HAVE THE FOLLOWING SYMPTOMS: fever, worsening symptoms, headache, redness/swelling/pain over the bone behind your ear.

## 2024-08-19 NOTE — ED PROVIDER NOTE - PHYSICAL EXAMINATION
CONSTITUTIONAL: Well-appearing; well-nourished; in no apparent distress.   EYES: PERRL; EOM intact.   ENT: Right TM and ear canal normal.  Left ear canal swollen with minimal discharge.  Left TM normal.  CARDIOVASCULAR: Normal S1, S2; no murmurs, rubs, or gallops.   RESPIRATORY: Normal chest excursion with respiration; breath sounds clear and equal bilaterally; no wheezes, rhonchi, or rales.  SKIN: No skin changes to left over the year  NEURO/PSYCH: A & O x 4; grossly unremarkable.

## 2024-08-19 NOTE — ED PROVIDER NOTE - PATIENT PORTAL LINK FT
You can access the FollowMyHealth Patient Portal offered by [facility] by registering at the following website: [email or web address]. By joining [email or web address]’s FollowMyHealth portal, you will also be able to view your health information using other applications (apps) compatible with our system.

## 2024-08-19 NOTE — ED PROVIDER NOTE - CARE PROVIDER_API CALL
Lázaro Terry  Otolaryngology  [address] [place] [address]  Phone: [phone]  Fax: [phone]  Follow Up Time:

## 2024-08-19 NOTE — ED PROVIDER NOTE - CLINICAL SUMMARY MEDICAL DECISION MAKING FREE TEXT BOX
34-year-old male, no past medical history, here in ED for left otitis externa.  Ear wick applied.  Given Toradol, Cortisporin and Decadron.  Will DC to follow-up with PCP.

## 2024-08-21 ENCOUNTER — EMERGENCY (EMERGENCY)
Facility: HOSPITAL | Age: 34
LOS: 0 days | Discharge: HOME | End: 2024-08-21
Attending: EMERGENCY MEDICINE
Payer: COMMERCIAL

## 2024-08-21 VITALS
HEART RATE: 72 BPM | TEMPERATURE: 99 F | SYSTOLIC BLOOD PRESSURE: 170 MMHG | WEIGHT: 315 LBS | DIASTOLIC BLOOD PRESSURE: 105 MMHG | HEIGHT: 74 IN | RESPIRATION RATE: 18 BRPM | OXYGEN SATURATION: 99 %

## 2024-08-21 VITALS — DIASTOLIC BLOOD PRESSURE: 92 MMHG | SYSTOLIC BLOOD PRESSURE: 168 MMHG

## 2024-08-21 DIAGNOSIS — Z90.81 ACQUIRED ABSENCE OF SPLEEN: Chronic | ICD-10-CM

## 2024-08-21 DIAGNOSIS — H92.02 OTALGIA, LEFT EAR: ICD-10-CM

## 2024-08-21 DIAGNOSIS — F17.200 NICOTINE DEPENDENCE, UNSPECIFIED, UNCOMPLICATED: ICD-10-CM

## 2024-08-21 DIAGNOSIS — H60.92 UNSPECIFIED OTITIS EXTERNA, LEFT EAR: ICD-10-CM

## 2024-08-21 PROCEDURE — 99283 EMERGENCY DEPT VISIT LOW MDM: CPT

## 2024-08-21 RX ORDER — KETOROLAC TROMETHAMINE 10 MG
1 TABLET ORAL
Qty: 15 | Refills: 0
Start: 2024-08-21 | End: 2024-08-25

## 2024-08-21 NOTE — ED PROVIDER NOTE - PATIENT PORTAL LINK FT
You can access the FollowMyHealth Patient Portal offered by [facility] by registering at the following website: [email or web address]. By joining [employer]’s FollowMyHealth portal, you will also be able to view your health information using other applications (apps) compatible with our system.

## 2024-08-21 NOTE — ED ADULT NURSE NOTE - DISCHARGE DATE/TIME
----- Message from Sylvia Feldman 21-Aug-2024 07:47

## 2024-08-21 NOTE — ED PROVIDER NOTE - CLINICAL SUMMARY MEDICAL DECISION MAKING FREE TEXT BOX
Patient presented with atraumatic left ear pain x 4 days as documented, currently on Augmentin and Cortisporin with only mild improvement.  Patient had a wick placed by ENT at the [facility] site with improvement as well, but stating that he is still having pain in the ear.  Otherwise on arrival, patient afebrile, hemodynamically stable, no mastoid tenderness, no other significant findings on.  Patient is only beginning his antibiotic course and states the pain is his main issue.  Given the above, will prescribe anti-inflammatories, continuation of antibiotics as prescribed and close ENT follow-up outpatient.  Patient agreeable with plan. Agrees to return to ED for any new or worsening symptoms.

## 2024-08-21 NOTE — ED PROVIDER NOTE - OBJECTIVE STATEMENT
34-year-old male with history of high blood pressure presents to the ED complaining of left ear pain for 4 days.  Patient was seen in urgent care and started on Augmentin and Cortisporin.  Patient seen at [facility] on August 19 and had a wick placed.  Patient continues to have pain.  Patient denies any headache, fever, chills or weakness.

## 2024-08-21 NOTE — ED ADULT TRIAGE NOTE - CHIEF COMPLAINT QUOTE
pt c/o left sided ear pain for the last 2 days. pt was seen two days ago and has been on amoxicillin but states it inst working.

## 2024-08-21 NOTE — ED PROVIDER NOTE - NSFOLLOWUPINSTRUCTIONS_ED_ALL_ED_FT
Our Emergency Department Referral Coordinators will be reaching out to you in the next 24-48 hours from 9:00am to 5:00pm with a follow up appointment. Please expect a phone call from the hospital in that time frame. If you do not receive a call or if you have any questions or concerns, you can reach them at   [phone]    Otitis Externa    Otitis externa is a bacterial or fungal infection of the outer ear canal. This is the area from the eardrum to the outside of the ear. Otitis externa is sometimes called "swimmer's ear." Causes include swimming in dirty water, moisture remaining in ear after swimming or bathing, trauma to the ear, objects stuck in the ear, cuts or scrapes in our outside of the ear. Symptoms include itching, pain, swelling, redness in the ear canal, and fluid coming from the ear. To prevent recurrence of otitis media keep your ear dry, avoid scratching or putting objects inside your ear including cotton swabs, and avoid swimming in polluted water or poorly chlorinated pools.    SEEK MEDICAL CARE IF YOU HAVE THE FOLLOWING SYMPTOMS: fever, worsening symptoms, headache, redness/swelling/pain over the bone behind your ear.

## 2024-08-23 ENCOUNTER — APPOINTMENT (OUTPATIENT)
Dept: OTOLARYNGOLOGY | Facility: CLINIC | Age: 34
End: 2024-08-23
Payer: COMMERCIAL

## 2024-08-23 VITALS — HEIGHT: 74 IN | BODY MASS INDEX: 40.43 KG/M2 | WEIGHT: 315 LBS

## 2024-08-23 DIAGNOSIS — H60.312 DIFFUSE OTITIS EXTERNA, LEFT EAR: ICD-10-CM

## 2024-08-23 PROBLEM — Z00.00 ENCOUNTER FOR PREVENTIVE HEALTH EXAMINATION: Status: ACTIVE | Noted: 2024-08-23

## 2024-08-23 PROCEDURE — 99204 OFFICE O/P NEW MOD 45 MIN: CPT

## 2024-08-23 RX ORDER — CIPROFLOXACIN AND DEXAMETHASONE 3; 1 MG/ML; MG/ML
0.3-0.1 SUSPENSION/ DROPS AURICULAR (OTIC)
Qty: 1 | Refills: 3 | Status: ACTIVE | COMMUNITY
Start: 2024-08-23 | End: 1900-01-01

## 2024-08-23 NOTE — PHYSICAL EXAM
[Midline] : trachea located in midline position [Normal] : tympanic membranes are normal in both ears [de-identified] : Left EAC swollen. White purulent drainage.

## 2024-08-23 NOTE — HISTORY OF PRESENT ILLNESS
[de-identified] : Patient presents today c/o ear infection.  Left ear infection, started on Saturday. Was having slight pain and trouble hearing of the left ear. He has been taking amox- clav ,ketorolac and neomycin drop from urgent. Has felt some improvement while on medication. Had ear wick in left ear that came out. Currently vapes.  Noticed improvement with hearing. Denies any past history of ear infections. Works around loud noise. Would like to check hearing next visit.

## 2024-08-23 NOTE — ASSESSMENT
[FreeTextEntry1] : left OE - clinically improved, however still with significant left EAC edema.  Left ear culture taken.  Recommend finishing course of Augmentin PO Recommend switching to ciprodex drops, 5 drops BID to left ear for 2 weeks.  Follow up in 1 month.

## 2024-08-23 NOTE — PHYSICAL EXAM
[Midline] : trachea located in midline position [Normal] : tympanic membranes are normal in both ears [de-identified] : Left EAC swollen. White purulent drainage.

## 2024-08-23 NOTE — HISTORY OF PRESENT ILLNESS
[de-identified] : Patient presents today c/o ear infection.  Left ear infection, started on Saturday. Was having slight pain and trouble hearing of the left ear. He has been taking amox- clav ,ketorolac and neomycin drop from urgent. Has felt some improvement while on medication. Had ear wick in left ear that came out. Currently vapes.  Noticed improvement with hearing. Denies any past history of ear infections. Works around loud noise. Would like to check hearing next visit.

## 2024-08-27 NOTE — CHART NOTE - NSCHARTNOTEFT_GEN_A_CORE
[facility] MRN [id number]- lm ar 8/20. lm- ar 8/21. emailed ent- ar 8/21 / Update inquired 8/23 - AC / Appointment made - JL    Date: August 23, 2024	  Time: 12:15 PM  Location: [address] [place] [address]  Specialty: ENT

## 2024-08-29 LAB — EAR NOSE AND THROAT CULTURE: ABNORMAL

## 2024-11-25 NOTE — ED ADULT NURSE NOTE - OBJECTIVE STATEMENT
Occupational Therapy Daily Treatment Note  [address] [place] [id number]      Patient: Teo Billings   : 2004  Referring practitioner: Jj Barber MD  Date of Initial Visit: Type: THERAPY  Noted: 2024  Today's Date: 2024  Patient seen for 5 sessions         Subjective   Teo Billings reports: not really any pain.    Objective   See Exercise, Manual, and Modality Logs for complete treatment.   Pt did report some stinging pain with scar massage on radial and ulnar side of proximal phalanx. Place and hold PIP flexion 65 degrees today and DIP 35 degrees. Active extension still lacks 30 degrees. Scabbing and dead skin removed from middle finger and index fingers with good tolerance.    Assessment/Plan    Visit Diagnoses:    ICD-10-CM ICD-9-CM   1. Laceration of flexor muscle, fascia and tendon of left middle finger at wrist and hand level, subsequent encounter  S66.123D 842.10   2. Injury of digital nerve of left middle finger, subsequent encounter  S64.493D V58.89     955.6   3. Stiffness of finger joint of left hand  M25.642 719.54       Continue per POC         Timed:  Manual Therapy:    16    mins  88714;  Therapeutic Exercise:    10     mins  92196;     Therapeutic Activity:    0     mins  30988;  Ultrasound:     0     mins  27653;    Electrical Stimulation:    0     mins 42194;  Neuromuscular Douglas:    8    mins  24303;      Timed Treatment:   34   mins   Total Treatment:     34   min    Nicolette Corral OTR/L  Occupational Therapist    Electronically signed   License number [id number]   Pt. complains of left ear pain that began 2 day sago. Pt. reports starting Amoxicillin therapy, however feelings as if "it is not working". Pt. complains of left ear pain and swelling that began 2 day sago. Pt. reports starting Amoxicillin therapy, however feelings as if "it is not working" and symptoms has since worsen.